# Patient Record
(demographics unavailable — no encounter records)

---

## 2025-03-30 NOTE — HISTORY OF PRESENT ILLNESS
[Disease:__________________________] : Disease: [unfilled] [de-identified] : Ms. 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 She has been generally healthy. She c/o RLQ and right hip pain in July, 2018 and was found to have a mild relative lymphocytosis. Her total WBC was 10.6 in 1/12/18, Hgb 14.2, Plt 237,000. WBC was 11.1, Hgb 14.1, Plt 204,000 on 8/9/18 with 52% lymphs, 37% PMN. Flow showed monotypic B cells (30% of cells) (+) for dim kappa, CD19, dim CD20, CD23, CD5, (-) for FMC-7, CD10, CD38. The findings were interpreted as most consistent with MBL.  CT scan of abdomen and pelvis 6/2018 showed no adenopathy or H/S megaly.  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  She has no significant PMN.\par  \par  \par   \par   [de-identified] : SHANTHI v CLL [de-identified] : Doing well, no constitutional c/o,,or  recurrent resp infection takes no prescription meds

## 2025-03-30 NOTE — REVIEW OF SYSTEMS
[Negative] : Allergic/Immunologic [FreeTextEntry3] : eyeglasses [FreeTextEntry5] : chest lump lipoma

## 2025-03-30 NOTE — ASSESSMENT
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  Either way, observation alone is called for now.   CBC results discussed with patient RV 6 mos or PRN follow CMP, LDH , Immunoglobulins

## 2025-03-30 NOTE — HISTORY OF PRESENT ILLNESS
[Disease:__________________________] : Disease: [unfilled] [de-identified] : Ms. 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 She has been generally healthy. She c/o RLQ and right hip pain in July, 2018 and was found to have a mild relative lymphocytosis. Her total WBC was 10.6 in 1/12/18, Hgb 14.2, Plt 237,000. WBC was 11.1, Hgb 14.1, Plt 204,000 on 8/9/18 with 52% lymphs, 37% PMN. Flow showed monotypic B cells (30% of cells) (+) for dim kappa, CD19, dim CD20, CD23, CD5, (-) for FMC-7, CD10, CD38. The findings were interpreted as most consistent with MBL.  CT scan of abdomen and pelvis 6/2018 showed no adenopathy or H/S megaly.  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  She has no significant PMN.\par  \par  \par   \par   [de-identified] : SHANTHI v CLL [de-identified] : Doing well, no constitutional c/o,,or  recurrent resp infection takes no prescription meds